# Patient Record
Sex: FEMALE | Race: WHITE | NOT HISPANIC OR LATINO | URBAN - METROPOLITAN AREA
[De-identification: names, ages, dates, MRNs, and addresses within clinical notes are randomized per-mention and may not be internally consistent; named-entity substitution may affect disease eponyms.]

---

## 2018-12-26 ENCOUNTER — OFFICE VISIT (OUTPATIENT)
Dept: RETAIL CLINIC | Facility: CLINIC | Age: 38
End: 2018-12-26

## 2018-12-26 VITALS
TEMPERATURE: 98.2 F | DIASTOLIC BLOOD PRESSURE: 62 MMHG | SYSTOLIC BLOOD PRESSURE: 110 MMHG | RESPIRATION RATE: 18 BRPM | OXYGEN SATURATION: 98 % | HEART RATE: 72 BPM

## 2018-12-26 DIAGNOSIS — R59.1 LYMPHADENOPATHY: Primary | ICD-10-CM

## 2018-12-26 PROCEDURE — 99213 OFFICE O/P EST LOW 20 MIN: CPT | Performed by: NURSE PRACTITIONER

## 2018-12-26 RX ORDER — CEPHALEXIN 500 MG/1
500 CAPSULE ORAL 2 TIMES DAILY
Qty: 14 CAPSULE | Refills: 0 | Status: SHIPPED | OUTPATIENT
Start: 2018-12-26 | End: 2019-01-02

## 2018-12-26 RX ORDER — PREDNISONE 20 MG/1
20 TABLET ORAL DAILY
Qty: 5 TABLET | Refills: 0 | Status: SHIPPED | OUTPATIENT
Start: 2018-12-26 | End: 2018-12-31

## 2018-12-26 NOTE — PROGRESS NOTES
Subjective   Arti Arreaga is a 38 y.o. female.     Adenopathy   This is a new problem. The current episode started in the past 7 days (3-5 days). The problem occurs constantly. The problem has been unchanged. Associated symptoms include congestion and a sore throat. Pertinent negatives include no coughing, fatigue, fever or nausea. Associated symptoms comments: Mild cold symptoms  . Nothing aggravates the symptoms. Treatments tried: augmentin, flonase, zyrtec. The treatment provided no relief.        The following portions of the patient's history were reviewed and updated as appropriate: allergies, current medications, past family history, past medical history, past social history, past surgical history and problem list.    Review of Systems   Constitutional: Negative for fatigue and fever.   HENT: Positive for congestion and sore throat.    Respiratory: Negative for cough.    Gastrointestinal: Negative for nausea.   Musculoskeletal: Negative.    Neurological: Negative.        Objective   Physical Exam   Constitutional: She is cooperative. No distress.   HENT:   Head: Normocephalic.   Right Ear: Hearing, tympanic membrane, external ear and ear canal normal.   Left Ear: Hearing, tympanic membrane, external ear and ear canal normal.   Nose: Nose normal.   Mouth/Throat: Posterior oropharyngeal erythema present.   Eyes: Conjunctivae, EOM and lids are normal. Pupils are equal, round, and reactive to light.   Neck: Trachea normal and full passive range of motion without pain.   Cardiovascular: Normal rate, regular rhythm and normal pulses.   Pulmonary/Chest: Effort normal and breath sounds normal.   Lymphadenopathy:     She has cervical adenopathy.        Right cervical: Superficial cervical and deep cervical adenopathy present.   Right side anterior cervical lymph node moderately enlarged, no tenderness on palpation and node is moveable    Neurological: She is alert.   Skin: Skin is warm. Capillary refill takes less than  2 seconds.   Psychiatric: She has a normal mood and affect. Her speech is normal and behavior is normal.   Vitals reviewed.        Assessment/Plan   Arti was seen today for adenopathy.    Diagnoses and all orders for this visit:    Lymphadenopathy    Other orders  -     cephalexin (KEFLEX) 500 MG capsule; Take 1 capsule by mouth 2 (Two) Times a Day for 7 days.  -     predniSONE (DELTASONE) 20 MG tablet; Take 1 tablet by mouth Daily for 5 days.

## 2018-12-26 NOTE — PATIENT INSTRUCTIONS
Lymphadenopathy  Lymphadenopathy refers to swollen or enlarged lymph glands, also called lymph nodes. Lymph glands are part of your body's defense (immune) system, which protects the body from infections, germs, and diseases. Lymph glands are found in many locations in your body, including the neck, underarm, and groin.  Many things can cause lymph glands to become enlarged. When your immune system responds to germs, such as viruses or bacteria, infection-fighting cells and fluid build up. This causes the glands to grow in size. Usually, this is not something to worry about. The swelling and any soreness often go away without treatment. However, swollen lymph glands can also be caused by a number of diseases. Your health care provider may do various tests to help determine the cause. If the cause of your swollen lymph glands cannot be found, it is important to monitor your condition to make sure the swelling goes away.  Follow these instructions at home:  Watch your condition for any changes. The following actions may help to lessen any discomfort you are feeling:  · Get plenty of rest.  · Take medicines only as directed by your health care provider. Your health care provider may recommend over-the-counter medicines for pain.  · Apply moist heat compresses to the site of swollen lymph nodes as directed by your health care provider. This can help reduce any pain.  · Check your lymph nodes daily for any changes.  · Keep all follow-up visits as directed by your health care provider. This is important.    Contact a health care provider if:  · Your lymph nodes are still swollen after 2 weeks.  · Your swelling increases or spreads to other areas.  · Your lymph nodes are hard, seem fixed to the skin, or are growing rapidly.  · Your skin over the lymph nodes is red and inflamed.  · You have a fever.  · You have chills.  · You have fatigue.  · You develop a sore throat.  · You have abdominal pain.  · You have weight  loss.  · You have night sweats.  Get help right away if:  · You notice fluid leaking from the area of the enlarged lymph node.  · You have severe pain in any area of your body.  · You have chest pain.  · You have shortness of breath.  This information is not intended to replace advice given to you by your health care provider. Make sure you discuss any questions you have with your health care provider.  Document Released: 09/26/2009 Document Revised: 05/25/2017 Document Reviewed: 07/23/2015  [employer] Interactive Patient Education © 2018 [employer]